# Patient Record
Sex: FEMALE | Race: WHITE | HISPANIC OR LATINO
[De-identification: names, ages, dates, MRNs, and addresses within clinical notes are randomized per-mention and may not be internally consistent; named-entity substitution may affect disease eponyms.]

---

## 2019-01-15 ENCOUNTER — FORM ENCOUNTER (OUTPATIENT)
Age: 24
End: 2019-01-15

## 2019-03-11 PROBLEM — Z00.00 ENCOUNTER FOR PREVENTIVE HEALTH EXAMINATION: Status: ACTIVE | Noted: 2019-03-11

## 2019-03-13 ENCOUNTER — APPOINTMENT (OUTPATIENT)
Dept: ANTEPARTUM | Facility: CLINIC | Age: 24
End: 2019-03-13
Payer: COMMERCIAL

## 2019-03-13 ENCOUNTER — ASOB RESULT (OUTPATIENT)
Age: 24
End: 2019-03-13

## 2019-03-13 PROCEDURE — 76813 OB US NUCHAL MEAS 1 GEST: CPT

## 2019-03-13 PROCEDURE — 36416 COLLJ CAPILLARY BLOOD SPEC: CPT

## 2019-03-19 ENCOUNTER — TRANSCRIPTION ENCOUNTER (OUTPATIENT)
Age: 24
End: 2019-03-19

## 2019-03-20 LAB
1ST TRIMESTER DATA: NORMAL
ADDENDUM DOC: NORMAL
AFP PNL SERPL: NORMAL
AFP SERPL-ACNC: NORMAL
CLINICAL BIOCHEMIST REVIEW: ABNORMAL
FREE BETA HCG 1ST TRIMESTER: NORMAL
Lab: NORMAL
NOTES NTD: NORMAL
NT: NORMAL
PAPP-A SERPL-ACNC: NORMAL
TRISOMY 18/3: NORMAL

## 2019-03-28 ENCOUNTER — APPOINTMENT (OUTPATIENT)
Dept: MATERNAL FETAL MEDICINE | Facility: CLINIC | Age: 24
End: 2019-03-28
Payer: COMMERCIAL

## 2019-03-28 PROCEDURE — 99203 OFFICE O/P NEW LOW 30 MIN: CPT

## 2019-04-04 ENCOUNTER — APPOINTMENT (OUTPATIENT)
Dept: ANTEPARTUM | Facility: CLINIC | Age: 24
End: 2019-04-04

## 2019-04-04 ENCOUNTER — APPOINTMENT (OUTPATIENT)
Dept: MATERNAL FETAL MEDICINE | Facility: CLINIC | Age: 24
End: 2019-04-04
Payer: COMMERCIAL

## 2019-04-04 VITALS
HEIGHT: 65 IN | WEIGHT: 225 LBS | SYSTOLIC BLOOD PRESSURE: 118 MMHG | BODY MASS INDEX: 37.49 KG/M2 | OXYGEN SATURATION: 98 % | DIASTOLIC BLOOD PRESSURE: 66 MMHG | HEART RATE: 106 BPM

## 2019-04-04 DIAGNOSIS — Z83.79 FAMILY HISTORY OF OTHER DISEASES OF THE DIGESTIVE SYSTEM: ICD-10-CM

## 2019-04-04 DIAGNOSIS — Z82.49 FAMILY HISTORY OF ISCHEMIC HEART DISEASE AND OTHER DISEASES OF THE CIRCULATORY SYSTEM: ICD-10-CM

## 2019-04-04 DIAGNOSIS — Z78.9 OTHER SPECIFIED HEALTH STATUS: ICD-10-CM

## 2019-04-04 PROCEDURE — 99203 OFFICE O/P NEW LOW 30 MIN: CPT

## 2019-04-04 RX ORDER — VITAMIN C, CALCIUM, IRON, VITAMIN D3, VITAMIN E, VITAMIN B1, VITAMIN B2, VITAMIN B3, VITAMIN B6, FOLIC ACID, IODINE, ZINC, COPPER, DOCUSATE SODIUM, DOCOSAHEXAENOIC ACID (DHA) 27-1-50 MG
KIT ORAL
Refills: 0 | Status: ACTIVE | COMMUNITY

## 2019-04-04 NOTE — CHIEF COMPLAINT
[G ___] : G [unfilled] [P ___] : P [unfilled] [de-identified] : increased risk for Down syndrome on first trimester screen testing and a maternal low BRIDGETT A level

## 2019-04-04 NOTE — OB HISTORY
[Spontaneous] : Spontaneous conception [Sonogram] : sonogram [at ___ wks] : at [unfilled] weeks [Definite:  ___ (Date)] : the last menstrual period was [unfilled] [FreeTextEntry1] : A first trimester screening test done on March 13, 2019 reported an increase risk for Down syndrome and a low risk for trisomy 18/13. The maternal BRIDGETT- A level was noted to be low at the 5th percentile.\par \par She had genetic counseling on March 28, 2019 due to the increased risk for fetal Down syndrome. She declined prenatal diagnostic testing with a genetic amniocentesis. She elected to have a noninvasive prenatal screen test. The results of the noninvasive prenatal screen test are pending.

## 2019-04-04 NOTE — FAMILY HISTORY
[Age 35+ During Pregnancy] : not 35 or over during pregnancy [Reported Family History Of Birth Defects] : no congenital heart defects [Javon-Sachs Carrier] : no Javon-Sachs [Family History] : no mental retardation/autism [Reported Family History Of Genetic Disease] : no history of child defect in child of baby father [FreeTextEntry1] : increased risk of Down Syndrome on first trimester screen

## 2019-04-04 NOTE — DISCUSSION/SUMMARY
[FreeTextEntry1] : She is 16 weeks and 3 days gestation by early ultrasound dates.\par \par She is overweight and obesity has been associated with a number of maternal complications such as gestational diabetes, pre-eclampsia, thrombophlebitis, labor abnormalities, post-term pregnancies,  delivery, and operative complications. Obesity has been associated with adverse fetal outcomes such as late stillbirth and  deliveries.  Obese women also have a two to three-fold increased incidence in congenital anomalies. I discussed the increased risk of Down syndrome reported in the first trimester screening test. I also discussed the option of having a genetic amniocentesis. I made her aware of the risk of miscarriage associated with the procedure, the limitations of the procedure, and the alternative of not having the procedure. She told me that she is waiting for the results of the noninvasive prenatal screen test to decide whether to have a genetic amniocentesis. She is scheduled to have a detailed fetal anatomy ultrasound examination and a cervical length measurement on May 1, 2019.\par \par I discussed the significance of a low BRIDGETT-A level. I told her that BRIDGETT-A levels equal or less than the 5th percentile have been associated with a 2.7 fold increased risk for having a low birth weight infant. I also told her that there is a 2.4 fold increased risk for having a delivery before 34 weeks gestation, a 3.7 fold increased risk for having preeclampsia during pregnancy, a 3.3 fold increased risk for having a miscarriage or fetal loss before 24 weeks of gestation and a 1.9 fold increased risk for having a stillbirth or fetal loss at or after 24 weeks of gestation. I told her that I recommend a transvaginal ultrasound examination of the cervix at the time of the fetal anatomy ultrasound examination (between 18 - 20 weeks gestation) to screen for risk of  labor/delivery. I gave her  labor precautions. I advised her to continue her daily prenatal vitamins. I advised her to take a baby aspirin daily to decreased the risk of developing preeclampsia,  delivery, and a small baby. I also recommend weekly testing of fetal well-being with BPPs or NSTs due to the increased risk for stillbirth starting at 32 - 34 weeks of gestation.  She can also perform daily fetal movement counts as an adjunct to the NSTs or BPPs.  I also recommend delivery at 39 weeks gestation if she has not given birth before 39 weeks to reduce the risk of late stillbirths. I recommend serial ultrasounds during the third trimester to monitor fetal growth. \par \par I recommend a glucola challenge test to screen for gestational diabetes between 24 and 28 weeks gestation. I also recommend the Tdap vaccine between 27 and 36 weeks of gestation to prevent pertussis infection in the  infant. I recommend the flu vaccine during flu season (October through May).\par

## 2019-04-04 NOTE — VITALS
[GA= ___ Days] : and [unfilled] day(s) [US Date: ___] : ultrasound performed on [unfilled]. [GA= ___ Weeks] : Results were GA of [unfilled] weeks [MAXINE by US (date): ___] : The calculated MAXINE by US is [unfilled]

## 2019-05-01 ENCOUNTER — APPOINTMENT (OUTPATIENT)
Age: 24
End: 2019-05-01
Payer: COMMERCIAL

## 2019-05-01 ENCOUNTER — ASOB RESULT (OUTPATIENT)
Age: 24
End: 2019-05-01

## 2019-05-01 PROCEDURE — 76817 TRANSVAGINAL US OBSTETRIC: CPT

## 2019-05-01 PROCEDURE — 76811 OB US DETAILED SNGL FETUS: CPT

## 2019-05-17 ENCOUNTER — ASOB RESULT (OUTPATIENT)
Age: 24
End: 2019-05-17

## 2019-05-17 ENCOUNTER — APPOINTMENT (OUTPATIENT)
Age: 24
End: 2019-05-17
Payer: COMMERCIAL

## 2019-05-17 PROCEDURE — 76816 OB US FOLLOW-UP PER FETUS: CPT

## 2019-06-10 ENCOUNTER — FORM ENCOUNTER (OUTPATIENT)
Age: 24
End: 2019-06-10

## 2019-06-27 ENCOUNTER — APPOINTMENT (OUTPATIENT)
Dept: ANTEPARTUM | Facility: CLINIC | Age: 24
End: 2019-06-27

## 2019-07-05 ENCOUNTER — APPOINTMENT (OUTPATIENT)
Dept: ANTEPARTUM | Facility: CLINIC | Age: 24
End: 2019-07-05
Payer: COMMERCIAL

## 2019-07-05 ENCOUNTER — ASOB RESULT (OUTPATIENT)
Age: 24
End: 2019-07-05

## 2019-07-05 PROCEDURE — 76816 OB US FOLLOW-UP PER FETUS: CPT

## 2019-08-05 ENCOUNTER — ASOB RESULT (OUTPATIENT)
Age: 24
End: 2019-08-05

## 2019-08-05 ENCOUNTER — APPOINTMENT (OUTPATIENT)
Dept: ANTEPARTUM | Facility: CLINIC | Age: 24
End: 2019-08-05
Payer: COMMERCIAL

## 2019-08-05 PROCEDURE — 76819 FETAL BIOPHYS PROFIL W/O NST: CPT

## 2019-08-05 PROCEDURE — 76816 OB US FOLLOW-UP PER FETUS: CPT

## 2019-09-05 ENCOUNTER — APPOINTMENT (OUTPATIENT)
Dept: ANTEPARTUM | Facility: CLINIC | Age: 24
End: 2019-09-05
Payer: COMMERCIAL

## 2019-09-05 ENCOUNTER — ASOB RESULT (OUTPATIENT)
Age: 24
End: 2019-09-05

## 2019-09-05 PROCEDURE — 76816 OB US FOLLOW-UP PER FETUS: CPT

## 2019-09-05 PROCEDURE — 76819 FETAL BIOPHYS PROFIL W/O NST: CPT

## 2019-09-17 ENCOUNTER — FORM ENCOUNTER (OUTPATIENT)
Age: 24
End: 2019-09-17

## 2021-01-05 ENCOUNTER — FORM ENCOUNTER (OUTPATIENT)
Age: 26
End: 2021-01-05

## 2021-12-08 DIAGNOSIS — Z80.3 FAMILY HISTORY OF MALIGNANT NEOPLASM OF BREAST: ICD-10-CM

## 2021-12-08 DIAGNOSIS — N92.5 OTHER SPECIFIED IRREGULAR MENSTRUATION: ICD-10-CM

## 2021-12-13 ENCOUNTER — APPOINTMENT (OUTPATIENT)
Dept: OBGYN | Facility: CLINIC | Age: 26
End: 2021-12-13
Payer: COMMERCIAL

## 2021-12-13 VITALS — DIASTOLIC BLOOD PRESSURE: 84 MMHG | SYSTOLIC BLOOD PRESSURE: 120 MMHG | WEIGHT: 198 LBS | BODY MASS INDEX: 32.95 KG/M2

## 2021-12-13 VITALS — HEIGHT: 65 IN

## 2021-12-13 PROCEDURE — 99213 OFFICE O/P EST LOW 20 MIN: CPT

## 2021-12-13 NOTE — DISCUSSION/SUMMARY
[FreeTextEntry1] : Patient doing well on Junel 1/20 will continue all risk benefits pros cons alternatives discussed in layman's terms she will return in 6 months for yearly checkup

## 2021-12-13 NOTE — HISTORY OF PRESENT ILLNESS
[HIV test declined] : HIV test declined [Syphilis test declined] : Syphilis test declined [Gonorrhea test declined] : Gonorrhea test declined [Chlamydia test declined] : Chlamydia test declined [Trichomonas test declined] : Trichomonas test declined [HPV test declined] : HPV test declined [Hepatitis B test declined] : Hepatitis B test declined [Hepatitis C test declined] : Hepatitis C test declined [Y] : Positive pregnancy history [N/A] : pregnancy not applicable [Normal Amount/Duration] :  normal amount and duration [Currently Active] : currently active [Men] : men [Vaginal] : vaginal [No] : No [Patient refuses STI testing] : Patient refuses STI testing [TextBox_4] : PT HERE FOR BIRTH CONTROL RENEW JUNEL FE 1/20  [LMPDate] : 11/28/21 [MensesFreq] : 20 [MensesLength] : 5 [MensesAmount] : MILD [PGxTotal] : 1 [[facility] : [facility] [[facility]] : 1 [TextBox_6] : 11/28/21  [TextBox_9] : 10 [TextBox_13] : 28 [TextBox_15] : 5 [FreeTextEntry1] : 11/28/21

## 2021-12-13 NOTE — HISTORY OF PRESENT ILLNESS
[HIV test declined] : HIV test declined [Syphilis test declined] : Syphilis test declined [Gonorrhea test declined] : Gonorrhea test declined [Chlamydia test declined] : Chlamydia test declined [Trichomonas test declined] : Trichomonas test declined [HPV test declined] : HPV test declined [Hepatitis B test declined] : Hepatitis B test declined [Hepatitis C test declined] : Hepatitis C test declined [Y] : Positive pregnancy history [N/A] : pregnancy not applicable [Normal Amount/Duration] :  normal amount and duration [Currently Active] : currently active [Men] : men [Vaginal] : vaginal [No] : No [Patient refuses STI testing] : Patient refuses STI testing [TextBox_4] : PT HERE FOR BIRTH CONTROL RENEW JUNEL FE 1/20  [LMPDate] : 11/28/21 [MensesFreq] : 20 [MensesLength] : 5 [MensesAmount] : MILD [PGxTotal] : 1 [[facility]] : 1 [[facility] [TextBox_6] : 11/28/21  [TextBox_9] : 10 [TextBox_13] : 28 [TextBox_15] : 5 [FreeTextEntry1] : 11/28/21

## 2021-12-13 NOTE — PHYSICAL EXAM
[Chaperone Present] : A chaperone was present in the examining room during all aspects of the physical examination [FreeTextEntry1] : PH [Normal] : normal [Examination Of The Breasts] : a normal appearance [No Masses] : no breast masses were palpable

## 2021-12-13 NOTE — PLAN
[FreeTextEntry1] : Well on Junel 120 with regular menses all risk benefits pros cons discussed in layman's terms and questions answered return in 6 months

## 2021-12-13 NOTE — PHYSICAL EXAM
[Chaperone Present] : A chaperone was present in the examining room during all aspects of the physical examination [FreeTextEntry1] : PH [Examination Of The Breasts] : a normal appearance [Normal] : normal [No Masses] : no breast masses were palpable

## 2022-03-15 RX ORDER — NORETHINDRONE ACETATE AND ETHINYL ESTRADIOL 1; 20 MG/1; UG/1
1-20 TABLET ORAL DAILY
Qty: 28 | Refills: 5 | Status: DISCONTINUED | COMMUNITY
Start: 2021-12-13 | End: 2022-03-15

## 2022-06-22 ENCOUNTER — NON-APPOINTMENT (OUTPATIENT)
Age: 27
End: 2022-06-22

## 2022-06-22 ENCOUNTER — APPOINTMENT (OUTPATIENT)
Dept: OBGYN | Facility: CLINIC | Age: 27
End: 2022-06-22
Payer: COMMERCIAL

## 2022-06-22 VITALS
HEIGHT: 65 IN | SYSTOLIC BLOOD PRESSURE: 120 MMHG | DIASTOLIC BLOOD PRESSURE: 72 MMHG | WEIGHT: 200 LBS | BODY MASS INDEX: 33.32 KG/M2

## 2022-06-22 DIAGNOSIS — O28.0 ABNORMAL HEMATOLOGICAL FINDING ON ANTENATAL SCREENING OF MOTHER: ICD-10-CM

## 2022-06-22 DIAGNOSIS — O28.5 ABNORMAL CHROMOSOMAL AND GENETIC FINDING ON ANTENATAL SCREENING OF MOTHER: ICD-10-CM

## 2022-06-22 DIAGNOSIS — Z3A.16 16 WEEKS GESTATION OF PREGNANCY: ICD-10-CM

## 2022-06-22 DIAGNOSIS — O09.899 ABNORMAL HEMATOLOGICAL FINDING ON ANTENATAL SCREENING OF MOTHER: ICD-10-CM

## 2022-06-22 DIAGNOSIS — O99.212 OBESITY COMPLICATING PREGNANCY, SECOND TRIMESTER: ICD-10-CM

## 2022-06-22 PROCEDURE — 99395 PREV VISIT EST AGE 18-39: CPT

## 2022-06-22 NOTE — HISTORY OF PRESENT ILLNESS
[HIV test declined] : HIV test declined [Syphilis test declined] : Syphilis test declined [Gonorrhea test declined] : Gonorrhea test declined [Chlamydia test declined] : Chlamydia test declined [Trichomonas test declined] : Trichomonas test declined [HPV test declined] : HPV test declined [Hepatitis B test declined] : Hepatitis B test declined [Hepatitis C test declined] : Hepatitis C test declined [N] : Patient reports normal menses [Y] : Positive pregnancy history [Normal Amount/Duration] :  normal amount and duration [Regular Cycle Intervals] : periods have been regular [Currently Active] : currently active [Men] : men [Vaginal] : vaginal [No] : No [Yes] : Yes [TextBox_4] : PT HERE FOR ANNUAL EXAM AND TO REFILL JUNEL FE 1/20 [PapSmeardate] : 01/06/21 [TextBox_31] : NORMAL [LMPDate] : 05/31/22 [MensesFreq] : 28 [MensesLength] : 4 [PGxTotal] : 1 [[facility]] : 1 [[facility]] : [facility] [TextBox_6] : 05/31/22 [TextBox_9] : 10 [TextBox_13] : 28 [TextBox_15] : 4 [FreeTextEntry1] : 05/31/22 [FreeTextEntry3] : PILLS

## 2022-06-22 NOTE — PLAN
[FreeTextEntry1] : Pap smear completed breast self-exam taught continue Junel 1/20 FE all risk benefits pros cons discussed in layman's terms questions answered return in 6 months

## 2022-06-22 NOTE — PHYSICAL EXAM

## 2022-06-24 ENCOUNTER — TRANSCRIPTION ENCOUNTER (OUTPATIENT)
Age: 27
End: 2022-06-24

## 2022-06-24 LAB
C TRACH RRNA SPEC QL NAA+PROBE: NOT DETECTED
HPV HIGH+LOW RISK DNA PNL CVX: NOT DETECTED
N GONORRHOEA RRNA SPEC QL NAA+PROBE: NOT DETECTED
SOURCE TP AMPLIFICATION: NORMAL

## 2022-06-27 LAB — CYTOLOGY CVX/VAG DOC THIN PREP: NORMAL

## 2022-12-12 ENCOUNTER — APPOINTMENT (OUTPATIENT)
Dept: OBGYN | Facility: CLINIC | Age: 27
End: 2022-12-12

## 2022-12-12 PROCEDURE — 99213 OFFICE O/P EST LOW 20 MIN: CPT

## 2022-12-12 NOTE — HISTORY OF PRESENT ILLNESS
[N] : Patient reports normal menses [Y] : Positive pregnancy history [N/A] : pregnancy not applicable [Normal Amount/Duration] :  normal amount and duration [Regular Cycle Intervals] : periods have been regular [Currently Active] : currently active [Men] : men [Vaginal] : vaginal [No] : No [TextBox_4] : PT HERE FOR BC RENEWAL\par PT IS ON JUNEL FE 1/20.  [LMPDate] : 11/26/22 [MensesFreq] : 28 [MensesLength] : 4 [PGxTotal] : 1 [[facility]xF[facility]] : 1 [[facility] Centeriving] : 1 [TextBox_6] : 11/26/22 [TextBox_9] : 12 [TextBox_13] : 28 [TextBox_15] : 4 [FreeTextEntry1] : 11/26/22

## 2023-06-19 ENCOUNTER — APPOINTMENT (OUTPATIENT)
Dept: OBGYN | Facility: CLINIC | Age: 28
End: 2023-06-19
Payer: COMMERCIAL

## 2023-06-19 ENCOUNTER — NON-APPOINTMENT (OUTPATIENT)
Age: 28
End: 2023-06-19

## 2023-06-19 VITALS
OXYGEN SATURATION: 97 % | WEIGHT: 220 LBS | BODY MASS INDEX: 36.65 KG/M2 | SYSTOLIC BLOOD PRESSURE: 115 MMHG | HEIGHT: 65 IN | DIASTOLIC BLOOD PRESSURE: 78 MMHG | HEART RATE: 80 BPM

## 2023-06-19 DIAGNOSIS — Z11.3 ENCOUNTER FOR SCREENING FOR INFECTIONS WITH A PREDOMINANTLY SEXUAL MODE OF TRANSMISSION: ICD-10-CM

## 2023-06-19 DIAGNOSIS — Z11.51 ENCOUNTER FOR SCREENING FOR HUMAN PAPILLOMAVIRUS (HPV): ICD-10-CM

## 2023-06-19 DIAGNOSIS — Z01.419 ENCOUNTER FOR GYNECOLOGICAL EXAMINATION (GENERAL) (ROUTINE) W/OUT ABNORMAL FINDINGS: ICD-10-CM

## 2023-06-19 DIAGNOSIS — Z12.4 ENCOUNTER FOR SCREENING FOR MALIGNANT NEOPLASM OF CERVIX: ICD-10-CM

## 2023-06-19 PROCEDURE — 99395 PREV VISIT EST AGE 18-39: CPT

## 2023-06-19 NOTE — HISTORY OF PRESENT ILLNESS
[Patient reported PAP Smear was normal] : Patient reported PAP Smear was normal [HIV test declined] : HIV test declined [Syphilis test declined] : Syphilis test declined [Gonorrhea test declined] : Gonorrhea test declined [Chlamydia test declined] : Chlamydia test declined [Trichomonas test declined] : Trichomonas test declined [HPV test declined] : HPV test declined [Hepatitis B test declined] : Hepatitis B test declined [Hepatitis C test declined] : Hepatitis C test declined [N] : Patient does not use contraception [Y] : Positive pregnancy history [N/A] : pregnancy not applicable [Currently Active] : currently active [Vaginal] : vaginal [No] : No [TextBox_4] : ANNUAL\par JUNEL FE 1/20\par LMP: 6/7/2023 [PapSmeardate] : 6/22/22 [TextBox_31] : WNL [LMPDate] : 6/7/2023 [PGxTotal] : 1 [[facility]] : 1 [[facility]iving] : 1 [TextBox_6] : 6/7/2023 [FreeTextEntry1] : 6/7/2023

## 2023-06-19 NOTE — PHYSICAL EXAM
[Chaperone Present] : A chaperone was present in the examining room during all aspects of the physical examination [Appropriately responsive] : appropriately responsive [Alert] : alert [No Acute Distress] : no acute distress [No Lymphadenopathy] : no lymphadenopathy [Regular Rate Rhythm] : regular rate rhythm [No Murmurs] : no murmurs [Clear to Auscultation B/L] : clear to auscultation bilaterally [Soft] : soft [Non-tender] : non-tender [Non-distended] : non-distended [No HSM] : No HSM [No Lesions] : no lesions [No Mass] : no mass [Oriented x3] : oriented x3 [Examination Of The Breasts] : a normal appearance [No Masses] : no breast masses were palpable [Labia Majora] : normal [Labia Minora] : normal [No Bleeding] : There was no active vaginal bleeding [Normal] : normal [Anteversion] : anteverted [Uterine Adnexae] : normal [Declined] : Patient declined rectal exam [FreeTextEntry1] : Kaitlynn [FreeTextEntry6] : Symmetrical, pendulous [FreeTextEntry8] : No masses nontender bilateral

## 2023-06-19 NOTE — PLAN
[FreeTextEntry1] : Pap smear completed we will continue Junel 120 FE risk-benefit analysis discussed patient to return in 6 months

## 2023-06-26 LAB — CYTOLOGY CVX/VAG DOC THIN PREP: NORMAL

## 2023-12-18 ENCOUNTER — APPOINTMENT (OUTPATIENT)
Dept: OBGYN | Facility: CLINIC | Age: 28
End: 2023-12-18

## 2023-12-31 PROBLEM — Z11.3 ENCOUNTER FOR SCREENING EXAMINATION FOR SEXUALLY TRANSMITTED DISEASE: Status: RESOLVED | Noted: 2023-06-19 | Resolved: 2023-07-03

## 2024-01-19 ENCOUNTER — APPOINTMENT (OUTPATIENT)
Dept: OBGYN | Facility: CLINIC | Age: 29
End: 2024-01-19
Payer: COMMERCIAL

## 2024-01-19 VITALS
HEIGHT: 65 IN | DIASTOLIC BLOOD PRESSURE: 89 MMHG | SYSTOLIC BLOOD PRESSURE: 131 MMHG | WEIGHT: 220 LBS | BODY MASS INDEX: 36.65 KG/M2

## 2024-01-19 DIAGNOSIS — Z30.41 ENCOUNTER FOR SURVEILLANCE OF CONTRACEPTIVE PILLS: ICD-10-CM

## 2024-01-19 PROCEDURE — 99213 OFFICE O/P EST LOW 20 MIN: CPT

## 2024-01-19 NOTE — PHYSICAL EXAM
[Chaperone Present] : A chaperone was present in the examining room during all aspects of the physical examination [FreeTextEntry1] : KT

## 2024-01-19 NOTE — HISTORY OF PRESENT ILLNESS
[TextBox_4] : PATIENT IS HERE FOR BIRTH CONTROL RENEWAL JUNEL FE 1/20  LMP 1/16/24 [LMPDate] : 1/16/24 [TextBox_6] : 1/16/24 [FreeTextEntry1] : 1/16/24

## 2024-05-03 RX ORDER — NORETHINDRONE ACETATE AND ETHINYL ESTRADIOL AND FERROUS FUMARATE 1MG-20(21)
1-20 KIT ORAL DAILY
Qty: 1 | Refills: 5 | Status: ACTIVE | COMMUNITY
Start: 2022-03-15 | End: 1900-01-01

## 2024-08-16 ENCOUNTER — APPOINTMENT (OUTPATIENT)
Dept: OBGYN | Facility: CLINIC | Age: 29
End: 2024-08-16

## 2024-11-15 ENCOUNTER — APPOINTMENT (OUTPATIENT)
Dept: OBGYN | Facility: CLINIC | Age: 29
End: 2024-11-15

## 2024-12-24 ENCOUNTER — NON-APPOINTMENT (OUTPATIENT)
Age: 29
End: 2024-12-24

## 2024-12-24 ENCOUNTER — APPOINTMENT (OUTPATIENT)
Dept: OBGYN | Facility: CLINIC | Age: 29
End: 2024-12-24
Payer: COMMERCIAL

## 2024-12-24 VITALS
HEIGHT: 65 IN | BODY MASS INDEX: 24.99 KG/M2 | DIASTOLIC BLOOD PRESSURE: 81 MMHG | WEIGHT: 150 LBS | HEART RATE: 69 BPM | SYSTOLIC BLOOD PRESSURE: 115 MMHG

## 2024-12-24 DIAGNOSIS — Z30.41 ENCOUNTER FOR SURVEILLANCE OF CONTRACEPTIVE PILLS: ICD-10-CM

## 2024-12-24 DIAGNOSIS — Z12.4 ENCOUNTER FOR SCREENING FOR MALIGNANT NEOPLASM OF CERVIX: ICD-10-CM

## 2024-12-24 DIAGNOSIS — Z11.3 ENCOUNTER FOR SCREENING FOR INFECTIONS WITH A PREDOMINANTLY SEXUAL MODE OF TRANSMISSION: ICD-10-CM

## 2024-12-24 DIAGNOSIS — Z11.51 ENCOUNTER FOR SCREENING FOR HUMAN PAPILLOMAVIRUS (HPV): ICD-10-CM

## 2024-12-24 DIAGNOSIS — Z01.419 ENCOUNTER FOR GYNECOLOGICAL EXAMINATION (GENERAL) (ROUTINE) W/OUT ABNORMAL FINDINGS: ICD-10-CM

## 2024-12-24 PROCEDURE — 99395 PREV VISIT EST AGE 18-39: CPT

## 2024-12-24 PROCEDURE — 99459 PELVIC EXAMINATION: CPT

## 2024-12-31 LAB — CYTOLOGY CVX/VAG DOC THIN PREP: NORMAL

## 2025-08-01 ENCOUNTER — APPOINTMENT (OUTPATIENT)
Dept: OBGYN | Facility: CLINIC | Age: 30
End: 2025-08-01
Payer: COMMERCIAL

## 2025-08-01 VITALS
HEIGHT: 65 IN | BODY MASS INDEX: 24.16 KG/M2 | HEART RATE: 67 BPM | WEIGHT: 145 LBS | SYSTOLIC BLOOD PRESSURE: 124 MMHG | DIASTOLIC BLOOD PRESSURE: 80 MMHG

## 2025-08-01 PROCEDURE — 99213 OFFICE O/P EST LOW 20 MIN: CPT

## 2025-08-01 PROCEDURE — 99459 PELVIC EXAMINATION: CPT
